# Patient Record
Sex: MALE | Race: WHITE | NOT HISPANIC OR LATINO | Employment: FULL TIME
[De-identification: names, ages, dates, MRNs, and addresses within clinical notes are randomized per-mention and may not be internally consistent; named-entity substitution may affect disease eponyms.]

---

## 2023-04-15 ENCOUNTER — HEALTH MAINTENANCE LETTER (OUTPATIENT)
Age: 28
End: 2023-04-15

## 2023-04-20 ENCOUNTER — OFFICE VISIT (OUTPATIENT)
Dept: FAMILY MEDICINE | Facility: CLINIC | Age: 28
End: 2023-04-20
Payer: COMMERCIAL

## 2023-04-20 VITALS
BODY MASS INDEX: 30.52 KG/M2 | RESPIRATION RATE: 14 BRPM | TEMPERATURE: 98.4 F | WEIGHT: 201.4 LBS | SYSTOLIC BLOOD PRESSURE: 124 MMHG | HEIGHT: 68 IN | HEART RATE: 99 BPM | OXYGEN SATURATION: 97 % | DIASTOLIC BLOOD PRESSURE: 74 MMHG

## 2023-04-20 DIAGNOSIS — Z13.220 SCREENING FOR LIPOID DISORDERS: ICD-10-CM

## 2023-04-20 DIAGNOSIS — F90.9 ATTENTION DEFICIT HYPERACTIVITY DISORDER (ADHD), UNSPECIFIED ADHD TYPE: Primary | ICD-10-CM

## 2023-04-20 DIAGNOSIS — Z23 NEED FOR VACCINATION: ICD-10-CM

## 2023-04-20 LAB
CHOLEST SERPL-MCNC: 155 MG/DL
HDLC SERPL-MCNC: 29 MG/DL
LDLC SERPL CALC-MCNC: 70 MG/DL
NONHDLC SERPL-MCNC: 126 MG/DL
TRIGL SERPL-MCNC: 281 MG/DL

## 2023-04-20 PROCEDURE — 90715 TDAP VACCINE 7 YRS/> IM: CPT | Performed by: FAMILY MEDICINE

## 2023-04-20 PROCEDURE — 99203 OFFICE O/P NEW LOW 30 MIN: CPT | Mod: 25 | Performed by: FAMILY MEDICINE

## 2023-04-20 PROCEDURE — 90471 IMMUNIZATION ADMIN: CPT | Performed by: FAMILY MEDICINE

## 2023-04-20 PROCEDURE — 80061 LIPID PANEL: CPT | Performed by: FAMILY MEDICINE

## 2023-04-20 PROCEDURE — 36415 COLL VENOUS BLD VENIPUNCTURE: CPT | Performed by: FAMILY MEDICINE

## 2023-04-20 ASSESSMENT — PAIN SCALES - GENERAL: PAINLEVEL: NO PAIN (0)

## 2023-04-20 NOTE — PROGRESS NOTES
"  Assessment & Plan   Problem List Items Addressed This Visit    None  Visit Diagnoses     Attention deficit hyperactivity disorder (ADHD), unspecified ADHD type    -  Primary    Relevant Orders    Adult Mental Health  Referral    Screening for lipoid disorders        Relevant Orders    Lipid panel reflex to direct LDL Non-fasting (Completed)    Need for vaccination        Relevant Orders    TDAP VACCINE (Adacel, Boostrix) (Completed)                RIANA RINALDI MD  [facility] CECY Vela is a 28 year old, presenting for the following health issues:  Establish Care (Has not been to a doctor for a long time. Was at [facility] and then [facility]. ), Mass (Back of left ankle. Pt states it is itchy and hard. ), and A.D.H.D (Pt has been previously diagnosed with ADHD and was on concerta at that time. Pt remembers not liking concerta because it took away his personality. )        4/20/2023     2:17 PM   Additional Questions   Roomed by Tessie Del Cid    A.D.H.D    History of Present Illness       Reason for visit:  Need to establish new primary care, i want to get a concerning skin issue looked at, and i want to seek treatment for adhd. I know that probably cant be all done in one appointment but i havent been to a doctor in years    He eats 0-1 servings of fruits and vegetables daily.He consumes 2 sweetened beverage(s) daily.He exercises with enough effort to increase his heart rate 30 to 60 minutes per day.  He exercises with enough effort to increase his heart rate 3 or less days per week.   He is taking medications regularly.              Objective    /74 (BP Location: Right arm, Patient Position: Sitting, Cuff Size: Adult Regular)   Pulse 99   Temp 98.4  F (36.9  C) (Oral)   Resp 14   Ht 1.727 m (5' 8\")   Wt 91.4 kg (201 lb 6.4 oz)   SpO2 97%   BMI 30.62 kg/m    Body mass index is 30.62 kg/m .  Physical Exam   GENERAL: healthy, " alert and no distress  NECK: no adenopathy, no asymmetry, masses, or scars and thyroid normal to palpation  RESP: lungs clear to auscultation - no rales, rhonchi or wheezes  CV: regular rate and rhythm, normal S1 S2, no S3 or S4, no murmur, click or rub, no peripheral edema and peripheral pulses strong     MS: no gross musculoskeletal defects noted, no edema

## 2023-04-28 ENCOUNTER — VIRTUAL VISIT (OUTPATIENT)
Dept: BEHAVIORAL HEALTH | Facility: CLINIC | Age: 28
End: 2023-04-28
Payer: COMMERCIAL

## 2023-04-28 ENCOUNTER — VIRTUAL VISIT (OUTPATIENT)
Dept: PSYCHIATRY | Facility: CLINIC | Age: 28
End: 2023-04-28
Attending: FAMILY MEDICINE
Payer: COMMERCIAL

## 2023-04-28 DIAGNOSIS — F84.0 AUTISTIC SPECTRUM DISORDER: ICD-10-CM

## 2023-04-28 DIAGNOSIS — F84.0 AUTISM SPECTRUM DISORDER: ICD-10-CM

## 2023-04-28 DIAGNOSIS — Z86.59 HISTORY OF ANXIETY: ICD-10-CM

## 2023-04-28 DIAGNOSIS — F90.9 ATTENTION DEFICIT HYPERACTIVITY DISORDER (ADHD), UNSPECIFIED ADHD TYPE: Primary | ICD-10-CM

## 2023-04-28 PROCEDURE — 99204 OFFICE O/P NEW MOD 45 MIN: CPT | Mod: VID | Performed by: PSYCHIATRY & NEUROLOGY

## 2023-04-28 PROCEDURE — 90791 PSYCH DIAGNOSTIC EVALUATION: CPT | Mod: VID | Performed by: PSYCHOLOGIST

## 2023-04-28 RX ORDER — DEXTROAMPHETAMINE SACCHARATE, AMPHETAMINE ASPARTATE MONOHYDRATE, DEXTROAMPHETAMINE SULFATE AND AMPHETAMINE SULFATE 3.75; 3.75; 3.75; 3.75 MG/1; MG/1; MG/1; MG/1
15 CAPSULE, EXTENDED RELEASE ORAL DAILY
Qty: 15 CAPSULE | Refills: 0 | Status: SHIPPED | OUTPATIENT
Start: 2023-04-28 | End: 2023-05-10 | Stop reason: DRUGHIGH

## 2023-04-28 ASSESSMENT — COLUMBIA-SUICIDE SEVERITY RATING SCALE - C-SSRS
TOTAL  NUMBER OF ABORTED OR SELF INTERRUPTED ATTEMPTS LIFETIME: NO
1. HAVE YOU WISHED YOU WERE DEAD OR WISHED YOU COULD GO TO SLEEP AND NOT WAKE UP?: NO
ATTEMPT LIFETIME: NO
2. HAVE YOU ACTUALLY HAD ANY THOUGHTS OF KILLING YOURSELF?: NO
6. HAVE YOU EVER DONE ANYTHING, STARTED TO DO ANYTHING, OR PREPARED TO DO ANYTHING TO END YOUR LIFE?: NO
TOTAL  NUMBER OF INTERRUPTED ATTEMPTS LIFETIME: NO

## 2023-04-28 ASSESSMENT — PATIENT HEALTH QUESTIONNAIRE - PHQ9
10. IF YOU CHECKED OFF ANY PROBLEMS, HOW DIFFICULT HAVE THESE PROBLEMS MADE IT FOR YOU TO DO YOUR WORK, TAKE CARE OF THINGS AT HOME, OR GET ALONG WITH OTHER PEOPLE: SOMEWHAT DIFFICULT
SUM OF ALL RESPONSES TO PHQ QUESTIONS 1-9: 9
SUM OF ALL RESPONSES TO PHQ QUESTIONS 1-9: 9
10. IF YOU CHECKED OFF ANY PROBLEMS, HOW DIFFICULT HAVE THESE PROBLEMS MADE IT FOR YOU TO DO YOUR WORK, TAKE CARE OF THINGS AT HOME, OR GET ALONG WITH OTHER PEOPLE: SOMEWHAT DIFFICULT
SUM OF ALL RESPONSES TO PHQ QUESTIONS 1-9: 9
SUM OF ALL RESPONSES TO PHQ QUESTIONS 1-9: 9
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
## 2023-04-28 NOTE — PATIENT INSTRUCTIONS
Treatment Plan:  Start Adderall XR 15 mg daily for ADHD.   Continue all other cares per primary care provider.   Continue all other medications as reviewed per electronic medical record today.   Safety plan reviewed. To the Emergency Department as needed or call after hours crisis line at [phone] or [phone]. [place] Crisis Text Line: Text [place] to 389375  or  Suicide LifeLine Chat: suicidepreventionForward Health Groupline.org/chat  Schedule an appointment with me in 2 weeks or sooner as needed.  Call [facility] at [phone] to schedule.  Follow up with primary care provider as planned or sooner if needed for acute medical concerns.  Call the psychiatric nurse line with medication questions or concerns at [phone].  [employer] may be used to communicate with your provider, but this is not intended to be used for emergencies.    Patient Education:  Discussed risks of stimulant medication including, but not limited to, decreased appetite, risk of tics (and that they may be lasting), trouble sleeping, cardiac risks such as increased heart rate and blood pressure, and rare risk of sudden cardiac death.  Also risk of addiction/tolerance/dependence.      Good ADHD resources:  Books-Mastering Adult ADHD, Driven to Distraction, Take Charge of Adult ADHD  Website-www.iSTAR    ADHD medication expectations:   https://www.Dojo/slideshows/zro-xozu-jnxc-medication-work/    What to Expect and What NOT to Expect from Stimulant Medication  by Anastasiya Travis, PhD    Many clients taking stimulant medication aren t sure what to expect from it. They may have unrealistic expectations of their medication and decide it s not working. Or they may have become used to the benefits of stimulant medication and think it s no longer working.    Here s a list of things to expect when taking stimulant medications. Of course, everyone has a different reaction and a different level of sensitivity to medication, so  some seem to benefit much more clearly than others.    A good response to stimulant medication typically results in:    - Improved attention span - being able to read longer while staying focused; being able to listen longer while staying focused.    - Reduced distractability - being able to remain focused when some distractions occur around you.    - Greater ease in getting back on task after an interruption.    - Better working memory - i.e., being able to remember the three things you went downstairs to get.    - Easier to start tasks and complete them.    - Reduced feeling of stress and overwhelm.    - Decreased irritability and over-reactivity    - Reduced feelings of restlessness or hyperactivity    - Reduced impulsiveness - less likely to interrupt, to make decisions with little consideration for costs or consequences    However, as Oliver Demarco MD, said so memorably,  Pills don t build skills.  You shouldn t expect stimulant medication to help you organize your files, improve your study skills, write your paper, prioritize your tasks, reduce your clutter, or problem-solve better. But it will put your brain in a state where these skills can be more easily learned.    Often, the best pairing is stimulant medication in combination with a therapist, organizer or  that is helping you build the skills you need to succeed now that you re able to focus and concentrate.      Community Resources:    National Suicide Prevention Lifeline: 209.332.3433 (TTY: 426.121.7651). Call anytime for help.  (www.suicidepreventionlifeline.org)  National [place] [employer] Mental Illness (www.yoan.org): 786.166.2668 or 503-006-9319.   Mental Health Association (www.mentalhealth.org): 350.909.2100 or 886-092-1004.  [place] Crisis Text Line: Text [place] to 053325  Suicide LifeLine Chat: suicidepreventionlifeline.org/chat    Patient Education   Collaborative Care Psychiatry Service  What to Expect  Here's what to expect from your  "Collaborative Care Psychiatry Service (CCPS).   About CCPS  CCPS means 2 people work together to help you get better. You'll meet with a behavioral health clinician and a psychiatric doctor. A behavioral health clinician helps people with mental health problems by talking with them. A psychiatric doctor helps people by giving them medicine.  How it works  At every visit, you'll see the behavioral health clinician (BHC) first. They'll talk with you about how you're doing and teach you how to feel better.   Then you'll see the psychiatric doctor. This doctor can help you deal with troubling thoughts and feelings by giving you medicine. They'll make sure you know the plan for your care.   CCPS usually takes 3 to 6 visits. If you need more visits, we may have you start seeing a different psychiatric doctor for ongoing care.  If you have any questions or concerns, we'll be glad to talk with you.  About visits  Be open  At your visits, please talk openly about your problems. It may feel hard, but it's the best way for us to help you.  Cancelling visits  If you can't come to your visit, please call us right away at [phone]. If you don't cancel at least 24 hours (1 full day) before your visit, that's \"late cancellation.\"  Being late to visits  Being very late is the same as not showing up. You will be a \"no show\" if:  Your appointment starts with a BHC, and you're more than 15 minutes late for a 30-minute (half hour) visit. This will also cancel your appointment with the psychiatric doctor.  Your appointment is with a psychiatric doctor only, and you're more than 15 minutes late for a 30-minute (half hour) visit.  Your appointment is with a psychiatric doctor only, and you're more than 30 minutes late for a 60-minute (full hour) visit.  If you cancel late or don't show up 2 times within 6 months, we may end your care.   Getting help between visits  If you need help between visits, you can call us Monday to Friday " from 8 a.m. to 4:30 p.m. at [phone].  Emergency care  Call 911 or go to the nearest emergency department if your life or someone else's life is in danger.  Call 988 anytime to reach the national Suicide and Crisis hotline.  Medicine refills  To refill your medicine, call your pharmacy. You can also call [facility]'s Behavioral Access at [phone], Monday to Friday, 8 a.m. to 4:30 p.m. It can take 1 to 3 business days to get a refill.   Forms, letters, and tests  You may have papers to fill out, like FMLA, short-term disability, and workability. We can help you with these forms at your visits, but you must have an appointment. You may need more than 1 visit for this, to be in an intensive therapy program, or both.  Before we can give you medicine for ADHD, we may refer you to get tested for it or confirm it another way.  We may not be able to give you an emotional support animal letter.  We don't do mental health checks ordered by the court.   We don't do mental health testing, but we can refer you to get tested.   Thank you for choosing us for your care.  For informational purposes only. Not to replace the advice of your health care provider. Copyright   2022 [facility]. All rights reserved. [employer] 366153 - 12/22.
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
## 2023-05-05 ENCOUNTER — MYC MEDICAL ADVICE (OUTPATIENT)
Dept: PSYCHIATRY | Facility: CLINIC | Age: 28
End: 2023-05-05
Payer: COMMERCIAL

## 2023-05-10 ENCOUNTER — VIRTUAL VISIT (OUTPATIENT)
Dept: PSYCHIATRY | Facility: CLINIC | Age: 28
End: 2023-05-10
Payer: COMMERCIAL

## 2023-05-10 DIAGNOSIS — Z86.59 HISTORY OF ANXIETY: ICD-10-CM

## 2023-05-10 DIAGNOSIS — F84.0 AUTISM SPECTRUM DISORDER: ICD-10-CM

## 2023-05-10 DIAGNOSIS — F90.9 ATTENTION DEFICIT HYPERACTIVITY DISORDER (ADHD), UNSPECIFIED ADHD TYPE: Primary | ICD-10-CM

## 2023-05-10 PROCEDURE — 99214 OFFICE O/P EST MOD 30 MIN: CPT | Mod: VID | Performed by: PSYCHIATRY & NEUROLOGY

## 2023-05-10 RX ORDER — DEXTROAMPHETAMINE SACCHARATE, AMPHETAMINE ASPARTATE, DEXTROAMPHETAMINE SULFATE AND AMPHETAMINE SULFATE 2.5; 2.5; 2.5; 2.5 MG/1; MG/1; MG/1; MG/1
5-10 TABLET ORAL DAILY PRN
Qty: 30 TABLET | Refills: 0 | Status: SHIPPED | OUTPATIENT
Start: 2023-05-10 | End: 2023-06-08

## 2023-05-10 RX ORDER — DEXTROAMPHETAMINE SACCHARATE, AMPHETAMINE ASPARTATE MONOHYDRATE, DEXTROAMPHETAMINE SULFATE AND AMPHETAMINE SULFATE 5; 5; 5; 5 MG/1; MG/1; MG/1; MG/1
20 CAPSULE, EXTENDED RELEASE ORAL DAILY
Qty: 30 CAPSULE | Refills: 0 | Status: SHIPPED | OUTPATIENT
Start: 2023-05-10 | End: 2023-06-08

## 2023-05-10 NOTE — PATIENT INSTRUCTIONS
Treatment Plan:  Increase Adderall XR to 20 mg daily for ADHD.  Start Adderall immediate release 5-10 mg daily as needed for ADHD.  Continue all other cares per primary care provider.   Continue all other medications as reviewed per electronic medical record today.   Safety plan reviewed. To the Emergency Department as needed or call after hours crisis line at [phone] or [phone]. [place] Crisis Text Line. Text [place] to 513105 or Suicide LifeLine Chat: suicidepreventionlifeline.org/chat  Continue therapy as needed.  Schedule an appointment with me in 4 weeks or sooner as needed. Call [facility] at [phone] to schedule.  Follow up with primary care provider as planned or for acute medical concerns.  Call the psychiatric nurse line with medication questions or concerns at [phone].  Read[employer] may be used to communicate with your provider, but this is not intended to be used for emergencies.    Have previously discussed risks of stimulant medication including, but not limited to, decreased appetite, risk of tics (and that they may be lasting), trouble sleeping, cardiac risks such as increased heart rate and blood pressure, and rare risk of sudden cardiac death.  Also risk of addiction/tolerance/dependence.    Patient Education   Collaborative Care Psychiatry Service  What to Expect  Here's what to expect from your Collaborative Care Psychiatry Service (CCPS).   About CCPS  CCPS means 2 people work together to help you get better. You'll meet with a behavioral health clinician and a psychiatric doctor. A behavioral health clinician helps people with mental health problems by talking with them. A psychiatric doctor helps people by giving them medicine.  How it works  At every visit, you'll see the behavioral health clinician (BHC) first. They'll talk with you about how you're doing and teach you how to feel better.   Then you'll see the psychiatric doctor. This doctor can help you deal with  "troubling thoughts and feelings by giving you medicine. They'll make sure you know the plan for your care.   CCPS usually takes 3 to 6 visits. If you need more visits, we may have you start seeing a different psychiatric doctor for ongoing care.  If you have any questions or concerns, we'll be glad to talk with you.  About visits  Be open  At your visits, please talk openly about your problems. It may feel hard, but it's the best way for us to help you.  Cancelling visits  If you can't come to your visit, please call us right away at [phone]. If you don't cancel at least 24 hours (1 full day) before your visit, that's \"late cancellation.\"  Being late to visits  Being very late is the same as not showing up. You will be a \"no show\" if:  Your appointment starts with a [facility], and you're more than 15 minutes late for a 30-minute (half hour) visit. This will also cancel your appointment with the psychiatric doctor.  Your appointment is with a psychiatric doctor only, and you're more than 15 minutes late for a 30-minute (half hour) visit.  Your appointment is with a psychiatric doctor only, and you're more than 30 minutes late for a 60-minute (full hour) visit.  If you cancel late or don't show up 2 times within 6 months, we may end your care.   Getting help between visits  If you need help between visits, you can call us Monday to Friday from 8 a.m. to 4:30 p.m. at [phone].  Emergency care  Call 911 or go to the nearest emergency department if your life or someone else's life is in danger.  Call 988 anytime to reach the national Suicide and Crisis hotline.  Medicine refills  To refill your medicine, call your pharmacy. You can also call [facility]'s Behavioral Access at [phone], Monday to Friday, 8 a.m. to 4:30 p.m. It can take 1 to 3 business days to get a refill.   Forms, letters, and tests  You may have papers to fill out, like FMLA, short-term disability, and workability. We can help you " with these forms at your visits, but you must have an appointment. You may need more than 1 visit for this, to be in an intensive therapy program, or both.  Before we can give you medicine for ADHD, we may refer you to get tested for it or confirm it another way.  We may not be able to give you an emotional support animal letter.  We don't do mental health checks ordered by the court.   We don't do mental health testing, but we can refer you to get tested.   Thank you for choosing us for your care.  For informational purposes only. Not to replace the advice of your health care provider. Copyright   2022 [facility]. All rights reserved. [employer] [id number] - 12/22.

## 2023-05-10 NOTE — PROGRESS NOTES
"Telemedicine Visit: The patient's condition can be safely assessed and treated via synchronous audio and visual telemedicine encounter.      Reason for Telemedicine Visit: Patient has requested telehealth visit    Originating Site (Patient Location): Patient's home    Distant Location (provider location):  On-site    Consent:  The patient/guardian has verbally consented to: the potential risks and benefits of telemedicine (video visit) versus in person care; bill my insurance or make self-payment for services provided; and responsibility for payment of non-covered services.     Mode of Communication:  Video Conference via [employer]    As the provider I attest to compliance with applicable laws and regulations related to telemedicine.         Outpatient Psychiatric Progress Note    Name: Dane Catsillo   : 1995                    Primary Care Provider: RIANA RINALDI MD   Therapist: None    PHQ-9 scores:      2023    10:35 AM   PHQ-9 SCORE   PHQ-9 Total Score MyChart 9 (Mild depression)   PHQ-9 Total Score 9    9       PATRICIA-7 scores:       View : No data to display.                Patient Identification:  Patient is a 28 year old, partnered / significant other  White Not  or  male  who presents for return visit with me.  Patient is currently employed full time. Patient attended the phone/video session alone. Patient prefers to be called: \"Dane\".    Interim History:  I last saw Dane Castlilo for outpatient psychiatry Consultation on 2023. During that appointment, we:      Start Adderall XR 15 mg daily for ADHD.     Continue all other cares per primary care provider.     5/10: Patient reports overall things going okay on Adderall.  Patient is finding it helpful.  Patient works overnights.  Takes medication around 5-6 PM and feels like it works quite well but does experience a decent dip around 6 hours into the medication.  Seems to  a little bit after the DIP but then wears off " "prior to the end of the patient's long shifts.  No acute safety concerns.  No SI.  No changes in drug or alcohol use-no problematic drug or alcohol use.  Patient denies any negative side effects from the medication.  No chest pain, no racing heart.  Patient is sleeping better.  Decreased appetite which patient finds beneficial because still eating sufficiently and getting hungry.    Per [facility], Dr. Sumit Castillo, during today's team-based visit:  No [facility] appt today.     Past Psychiatric Med Trials:  Psych Meds at Intake:  None     Past Psych Meds:  Citalopram - \"worked great for me\" felt like it had a lasting impact since still feels better off the medication, off several years  Concerta - father noted it may have changed pt's personality, pt did not like it either  Strattera - doesn't remember    Psychiatric ROS:  Dane Castillo reports mood has been: Stable  Anxiety has been: Stable  Sleep has been: Improved  Elizabeth sxs: None  Psychosis sxs: None  ADHD/ADD sxs: Improving  PTSD sxs: N/A  PHQ9 and GAD7 scores were reviewed today if completed.   Medication side effects: See HPI above  Current stressors include: And see HPI above  Coping mechanisms and supports include: Family, Hobbies and Friends    Current medications include:   Current Outpatient Medications   Medication Sig     amphetamine-dextroamphetamine (ADDERALL XR) 15 MG 24 hr capsule Take 1 capsule (15 mg) by mouth daily     No current facility-administered medications for this visit.       The [place] Prescription Monitoring Program has been reviewed and there are no concerns about diversionary activity for controlled substances at this time.   04/28/2023  1   04/28/2023  Dextroamp-Amphet Er 15 MG Cap  15.00  [facility]   [id number]   [facility] ([id number])   0/0   Comm Ins   [place]     Past Medical/Surgical History:  No past medical history on file.   has no past medical history of Arthritis, Cancer (H), Cerebral infarction (H), Congestive heart failure (H), COPD (chronic obstructive " pulmonary disease) (H), Depressive disorder, Diabetes (H), Heart disease, History of blood transfusion, Hypertension, Thyroid disease, or Uncomplicated asthma.    Social History:  Reviewed. No changes to social history except as noted above in HPI.    Vital Signs:   None. This is phone/video visit.     Labs:  Most recent laboratory results reviewed and no new labs.     Review of Systems:  10 systems (general, cardiovascular, respiratory, eyes, ENT, endocrine, GI, , M/S, neurological) were reviewed. Most pertinent finding(s) is/are:  denies fever, cough, persistent headaches, shortness of breath, chest pain, severe GI symptoms, trouble urinating, severe pain. The remaining systems are all unremarkable.    Mental Status Examination (limited as this is by phone/video):  Appearance: Awake, alert, appears stated age, no acute distress, well-groomed   Attitude:  cooperative, pleasant   Motor: No gross abnormalities observed via video, not formally tested   Oriented to:  person, place, time, and situation  Attention Span and Concentration:  normal  Speech:  clear, coherent, regular rate, rhythm, and volume  Language: intact  Mood: Good  Affect:  appropriate and in normal range and mood congruent  Associations:  no loose associations  Thought Process:  logical, linear and goal oriented  Thought Content:  no evidence of suicidal ideation or homicidal ideation, no evidence of psychotic thought, no auditory hallucinations present and no visual hallucinations present  Recent and Remote Memory:  Intact to interview. Not formally assessed. No amnesia.  Fund of Knowledge: appropriate  Insight:  good  Judgment:  intact, adequate for safety  Impulse Control:  intact    Suicide Risk Assessment:  Today Dane Castillo reports no suicidal ideation. Based on all available evidence including the factors cited above, Dane Castillo does not appear to be at imminent risk for self-harm, does not meet criteria for a 72-hr hold, and therefore  remains appropriate for ongoing outpatient level of care.  A thorough assessment of risk factors related to suicide and self-harm have been reviewed and are noted above. The patient convincingly denies suicidality on several occasions. Local community safety resources reviewed for patient to use if needed. There was no deceit detected, and the patient presented in a manner that was believable.     DSM5 Diagnosis:  Attention-Deficit/Hyperactivity Disorder  314.01 (F90.9) Unspecified Attention -Deficit / Hyperactivity Disorder   Autism Spectrum Disorder  Hx of Anxiety    Medical comorbidities include: There are no problems to display for this patient.      Psychosocial & Contextual Factors: see HPI above    Assessment:  From Intake, 4/28/2023:  Dane Castillo is a 28-year-old male with past psychiatric history including anxiety, autism spectrum disorder, ADHD who presents today for psychiatric evaluation.  Patient recently established care with primary care provider and expressed desire to restart ADHD medications.  Primary care provider referred to psychiatry.  Patient with history of anxiety and did well on citalopram but has been off the medication for several years.  Does not feel the need to be back on anxiolytic medication at this time.  Patient struggling with ADHD symptoms since work schedule is now 12 hours/day for 3 days a week.  History of Concerta but affected personality too much as a child and so would like to consider a trial with amphetamine-based stimulant.  Patient's insurance requires prior authorization for Vyvanse so we will start a trial with Adderall XR.  No personal cardiac history and no known major familial cardiac issues/congenital heart issues.  Patient denies any tics.  Discussed risks and benefits of therapy.  Patient would like to move forward with Adderall XR trial.  No problematic drug or alcohol use.     5/10/2023:  Patient overall with some improvement on Adderall XR.  We will continue  to optimize dose since patient is experiencing a decent mid day dip.  Does  some and then wears off again before shift is complete.  Patient does work long shifts.  Because of this we will add a booster dose patient can take towards the end of his shift if he prefers.  No acute safety concerns.  No SI.  No problematic drug or alcohol use.    Medication side effects and alternatives were reviewed. Health promotion activities recommended and reviewed today. All questions addressed. Education and counseling completed regarding risks and benefits of medications and psychotherapy options. Recommend therapy for additional support.     Treatment Plan:    Increase Adderall XR to 20 mg daily for ADHD.    Start Adderall immediate release 5-10 mg daily as needed for ADHD.    Continue all other cares per primary care provider.     Continue all other medications as reviewed per electronic medical record today.     Safety plan reviewed. To the Emergency Department as needed or call after hours crisis line at [phone] or [phone]. [place] Crisis Text Line. Text [place] to 809960 or Suicide LifeLine Chat: suicidepreventionlifeline.org/chat    Continue therapy as needed.    Schedule an appointment with me in 4 weeks or sooner as needed. Call [facility] at [phone] to schedule.    Follow up with primary care provider as planned or for acute medical concerns.    Call the psychiatric nurse line with medication questions or concerns at [phone].    MyChart may be used to communicate with your provider, but this is not intended to be used for emergencies.    Have previously discussed risks of stimulant medication including, but not limited to, decreased appetite, risk of tics (and that they may be lasting), trouble sleeping, cardiac risks such as increased heart rate and blood pressure, and rare risk of sudden cardiac death.  Also risk of addiction/tolerance/dependence.    Care team has reviewed  attendance agreement with patient. Patient advised that two failed appointments within 6 months may lead to termination of current episode of care.      Administrative Billing:   Phone Call/Video Duration: 10 Minutes  Start: 2:55p  Stop: 3:05p    Patient Status:  Patient will continue to be seen for ongoing consultation and stabilization.    Signed:   Cheyenne Jones DO  [facility]    Disclaimer: This note consists of symbols derived from keyboarding, dictation and/or voice recognition software. As a result, there may be errors in the script that have gone undetected. Please consider this when interpreting information found in this chart.

## 2023-06-08 ENCOUNTER — VIRTUAL VISIT (OUTPATIENT)
Dept: PSYCHIATRY | Facility: CLINIC | Age: 28
End: 2023-06-08
Payer: COMMERCIAL

## 2023-06-08 DIAGNOSIS — F84.0 AUTISM SPECTRUM DISORDER: ICD-10-CM

## 2023-06-08 DIAGNOSIS — F90.9 ATTENTION DEFICIT HYPERACTIVITY DISORDER (ADHD), UNSPECIFIED ADHD TYPE: Primary | ICD-10-CM

## 2023-06-08 DIAGNOSIS — Z86.59 HISTORY OF ANXIETY: ICD-10-CM

## 2023-06-08 PROCEDURE — 99214 OFFICE O/P EST MOD 30 MIN: CPT | Mod: VID | Performed by: PSYCHIATRY & NEUROLOGY

## 2023-06-08 RX ORDER — DEXTROAMPHETAMINE SACCHARATE, AMPHETAMINE ASPARTATE MONOHYDRATE, DEXTROAMPHETAMINE SULFATE AND AMPHETAMINE SULFATE 3.75; 3.75; 3.75; 3.75 MG/1; MG/1; MG/1; MG/1
15 CAPSULE, EXTENDED RELEASE ORAL DAILY
Qty: 30 CAPSULE | Refills: 0 | Status: SHIPPED | OUTPATIENT
Start: 2023-08-09 | End: 2023-09-08

## 2023-06-08 RX ORDER — DEXTROAMPHETAMINE SACCHARATE, AMPHETAMINE ASPARTATE, DEXTROAMPHETAMINE SULFATE AND AMPHETAMINE SULFATE 2.5; 2.5; 2.5; 2.5 MG/1; MG/1; MG/1; MG/1
5-10 TABLET ORAL DAILY PRN
Qty: 30 TABLET | Refills: 0 | Status: SHIPPED | OUTPATIENT
Start: 2023-08-09 | End: 2023-09-08

## 2023-06-08 RX ORDER — DEXTROAMPHETAMINE SACCHARATE, AMPHETAMINE ASPARTATE, DEXTROAMPHETAMINE SULFATE AND AMPHETAMINE SULFATE 2.5; 2.5; 2.5; 2.5 MG/1; MG/1; MG/1; MG/1
5-10 TABLET ORAL DAILY PRN
Qty: 30 TABLET | Refills: 0 | Status: SHIPPED | OUTPATIENT
Start: 2023-07-09 | End: 2023-08-08

## 2023-06-08 RX ORDER — DEXTROAMPHETAMINE SACCHARATE, AMPHETAMINE ASPARTATE MONOHYDRATE, DEXTROAMPHETAMINE SULFATE AND AMPHETAMINE SULFATE 3.75; 3.75; 3.75; 3.75 MG/1; MG/1; MG/1; MG/1
15 CAPSULE, EXTENDED RELEASE ORAL DAILY
Qty: 30 CAPSULE | Refills: 0 | Status: SHIPPED | OUTPATIENT
Start: 2023-07-09 | End: 2023-08-08

## 2023-06-08 RX ORDER — DEXTROAMPHETAMINE SACCHARATE, AMPHETAMINE ASPARTATE, DEXTROAMPHETAMINE SULFATE AND AMPHETAMINE SULFATE 2.5; 2.5; 2.5; 2.5 MG/1; MG/1; MG/1; MG/1
5-10 TABLET ORAL DAILY PRN
Qty: 30 TABLET | Refills: 0 | Status: SHIPPED | OUTPATIENT
Start: 2023-06-08 | End: 2023-07-08

## 2023-06-08 RX ORDER — DEXTROAMPHETAMINE SACCHARATE, AMPHETAMINE ASPARTATE MONOHYDRATE, DEXTROAMPHETAMINE SULFATE AND AMPHETAMINE SULFATE 3.75; 3.75; 3.75; 3.75 MG/1; MG/1; MG/1; MG/1
15 CAPSULE, EXTENDED RELEASE ORAL DAILY
Qty: 30 CAPSULE | Refills: 0 | Status: SHIPPED | OUTPATIENT
Start: 2023-06-08 | End: 2023-07-08

## 2023-06-08 NOTE — PROGRESS NOTES
"Telemedicine Visit: The patient's condition can be safely assessed and treated via synchronous audio and visual telemedicine encounter.      Reason for Telemedicine Visit: Patient has requested telehealth visit    Originating Site (Patient Location): Patient's home    Distant Location (provider location):  On-site    Consent:  The patient/guardian has verbally consented to: the potential risks and benefits of telemedicine (video visit) versus in person care; bill my insurance or make self-payment for services provided; and responsibility for payment of non-covered services.     Mode of Communication:  Video Conference via [employer]    As the provider I attest to compliance with applicable laws and regulations related to telemedicine.         Outpatient Psychiatric Progress Note    Name: Dane Castillo   : 1995                    Primary Care Provider: RIANA RINALDI MD   Therapist: None    PHQ-9 scores:      2023    10:35 AM   PHQ-9 SCORE   PHQ-9 Total Score MyChart 9 (Mild depression)   PHQ-9 Total Score 9    9       PATRICIA-7 scores:       View : No data to display.                Patient Identification:  Patient is a 28 year old, partnered / significant other  White Not  or  male  who presents for return visit with me.  Patient is currently employed full time. Patient attended the phone/video session alone. Patient prefers to be called: \"Dane\".    Interim History:  I last saw Dane Castillo for outpatient psychiatry return visit on 5/10/2023. During that appointment, we:      Increase Adderall XR to 20 mg daily for ADHD.    Start Adderall immediate release 5-10 mg daily as needed for ADHD.    Continue all other cares per primary care provider.     : Patient overall doing well on current medication regimen.  Patient reports gets a pit in his stomach if he does not take it with food and feels like the 20 mg XR dose might be a little too much.  Feels like the booster dose of Adderall " "immediate release has been very helpful in the evenings.  Does not take it any later than 6 hours before bedtime to ensure he can sleep well at night.  Tolerating medications well with no chest pains, racing heart, palpitations.  Adequate caloric intake on the medication.  Using as prescribed.  No acute safety concerns.  No SI.  No problematic drug or alcohol use.    Per [facility], ROSIE Peña, during today's team-based visit:  No [facility] appointment      Past Psychiatric Med Trials:  Psych Meds at Intake:  None     Past Psych Meds:  Citalopram - \"worked great for me\" felt like it had a lasting impact since still feels better off the medication, off several years  Concerta - father noted it may have changed pt's personality, pt did not like it either  Strattera - doesn't remember    Psychiatric ROS:  Dane Castillo reports mood has been: Stable  Anxiety has been: Stable  Sleep has been: Sleeping okay  Elizabeth sxs: None  Psychosis sxs: None  ADHD/ADD sxs: Stable  PTSD sxs: N/A  PHQ9 and GAD7 scores were reviewed today if completed.   Medication side effects: See HPI above  Current stressors include: And see HPI above  Coping mechanisms and supports include: Family, Hobbies and Friends    Current medications include:   Current Outpatient Medications   Medication Sig     amphetamine-dextroamphetamine (ADDERALL XR) 20 MG 24 hr capsule Take 1 capsule (20 mg) by mouth daily     amphetamine-dextroamphetamine (ADDERALL) 10 MG tablet Take 0.5-1 tablets (5-10 mg) by mouth daily as needed (ADHD)     No current facility-administered medications for this visit.       The [place] Prescription Monitoring Program has been reviewed and there are no concerns about diversionary activity for controlled substances at this time.   05/10/2023  05/10/2023   1  Dextroamp-Amphetamin 10 Mg Tab 30.00  30  [facility] [place]  [id number]   [place] ([id number])  0/0   Comm [place]  [place]    05/10/2023  05/10/2023   1  Dextroamp-Amphet Er 20 Mg Cap 30.00  30  Al [facility] [place]  [id number]   [place] " ([id number])  0/0   [place]  [place]    04/28/2023 04/28/2023   1  Dextroamp-Amphet Er 15 Mg Cap 15.00  15  Al u  [id number]   [facility] ([id number])  0/0   [place]  [place]        Past Medical/Surgical History:  No past medical history on file.   has no past medical history of Arthritis, Cancer (H), Cerebral infarction (H), Congestive heart failure (H), COPD (chronic obstructive pulmonary disease) (H), Depressive disorder, Diabetes (H), Heart disease, History of blood transfusion, Hypertension, Thyroid disease, or Uncomplicated asthma.    Social History:  Reviewed. No changes to social history except as noted above in HPI.    Vital Signs:   None. This is phone/video visit.     Labs:  Most recent laboratory results reviewed and no new labs.     Review of Systems:  10 systems (general, cardiovascular, respiratory, eyes, ENT, endocrine, GI, , M/S, neurological) were reviewed. Most pertinent finding(s) is/are:  denies fever, cough, persistent headaches, shortness of breath, chest pain, severe GI symptoms, trouble urinating, severe pain. The remaining systems are all unremarkable.    Mental Status Examination (limited as this is by phone/video):  Appearance: Awake, alert, appears stated age, no acute distress, well-groomed   Attitude:  cooperative, pleasant   Motor: No gross abnormalities observed via video, not formally tested   Oriented to:  person, place, time, and situation  Attention Span and Concentration:  normal  Speech:  clear, coherent, regular rate, rhythm, and volume  Language: intact  Mood: Good  Affect:  appropriate and in normal range and mood congruent  Associations:  no loose associations  Thought Process:  logical, linear and goal oriented  Thought Content:  no evidence of suicidal ideation or homicidal ideation, no evidence of psychotic thought, no auditory hallucinations present and no visual hallucinations present  Recent and Remote Memory:  Intact to interview. Not formally assessed. No amnesia.  Fund of Knowledge:  appropriate  Insight:  good  Judgment:  intact, adequate for safety  Impulse Control:  intact    Suicide Risk Assessment:  Today Dane Castillo reports no suicidal ideation. Based on all available evidence including the factors cited above, Dane Castillo does not appear to be at imminent risk for self-harm, does not meet criteria for a 72-hr hold, and therefore remains appropriate for ongoing outpatient level of care.  A thorough assessment of risk factors related to suicide and self-harm have been reviewed and are noted above. The patient convincingly denies suicidality on several occasions. Local community safety resources reviewed for patient to use if needed. There was no deceit detected, and the patient presented in a manner that was believable.     DSM5 Diagnosis:  Attention-Deficit/Hyperactivity Disorder  314.01 (F90.9) Unspecified Attention -Deficit / Hyperactivity Disorder   Autism Spectrum Disorder  Hx of Anxiety    Medical comorbidities include: There are no problems to display for this patient.      Psychosocial & Contextual Factors: see HPI above    Assessment:  From Intake, 4/28/2023:  Dane Castillo is a 28-year-old male with past psychiatric history including anxiety, autism spectrum disorder, ADHD who presents today for psychiatric evaluation.  Patient recently established care with primary care provider and expressed desire to restart ADHD medications.  Primary care provider referred to psychiatry.  Patient with history of anxiety and did well on citalopram but has been off the medication for several years.  Does not feel the need to be back on anxiolytic medication at this time.  Patient struggling with ADHD symptoms since work schedule is now 12 hours/day for 3 days a week.  History of Concerta but affected personality too much as a child and so would like to consider a trial with amphetamine-based stimulant.  Patient's insurance requires prior authorization for Vyvanse so we will start a trial with  Adderall XR.  No personal cardiac history and no known major familial cardiac issues/congenital heart issues.  Patient denies any tics.  Discussed risks and benefits of therapy.  Patient would like to move forward with Adderall XR trial.  No problematic drug or alcohol use.     5/10/2023:  Patient overall with some improvement on Adderall XR.  We will continue to optimize dose since patient is experiencing a decent mid day dip.  Does  some and then wears off again before shift is complete.  Patient does work long shifts.  Because of this we will add a booster dose patient can take towards the end of his shift if he prefers.  No acute safety concerns.  No SI.  No problematic drug or alcohol use.    6/8/2023:  Patient overall doing well on current medication regimen except felt like Adderall XR 20 mg a little too high of a dose.  We will return back to Adderall XR 15 mg daily and continue the booster dose in the late afternoon/evening.  Patient tolerating medication well with no negative side effects.  No acute safety concerns.  No SI.  No problematic drug or alcohol use.  Patient will follow up again in 3 months and if remains stable psychiatric care will be returned back to primary care provider.    Medication side effects and alternatives were reviewed. Health promotion activities recommended and reviewed today. All questions addressed. Education and counseling completed regarding risks and benefits of medications and psychotherapy options. Recommend therapy for additional support.     Treatment Plan:    Decrease Adderall XR back to 15 mg daily for ADHD.    Continue Adderall immediate release 5-10 mg daily as needed for ADHD.    Continue all other cares per primary care provider.     Continue all other medications as reviewed per electronic medical record today.     Safety plan reviewed. To the Emergency Department as needed or call after hours crisis line at [phone] or [phone]. [facility]  Text Line. Text [place] to 860585 or Suicide LifeLine Chat: suicidepreventionlifeline.org/chat    Continue therapy as needed.    Schedule an appointment with me in 3 months or sooner as needed. Call [facility] at [phone] to schedule.    Follow up with primary care provider as planned or for acute medical concerns.    Call the psychiatric nurse line with medication questions or concerns at [phone].    MyChart may be used to communicate with your provider, but this is not intended to be used for emergencies.    Have previously discussed risks of stimulant medication including, but not limited to, decreased appetite, risk of tics (and that they may be lasting), trouble sleeping, cardiac risks such as increased heart rate and blood pressure, and rare risk of sudden cardiac death.  Also risk of addiction/tolerance/dependence.    Care team has reviewed attendance agreement with patient. Patient advised that two failed appointments within 6 months may lead to termination of current episode of care.      Administrative Billing:   Phone Call/Video Duration: 10 Minutes  Start: 2:53p  Stop: 3:03p    Patient Status:  Patient will continue to be seen for ongoing consultation and stabilization.    Signed:   Cheyenne Jones DO  [facility] Psychiatry    Disclaimer: This note consists of symbols derived from keyboarding, dictation and/or voice recognition software. As a result, there may be errors in the script that have gone undetected. Please consider this when interpreting information found in this chart.

## 2023-06-08 NOTE — PATIENT INSTRUCTIONS
Treatment Plan:  Decrease Adderall XR back to 15 mg daily for ADHD.  Continue Adderall immediate release 5-10 mg daily as needed for ADHD.  Continue all other cares per primary care provider.   Continue all other medications as reviewed per electronic medical record today.   Safety plan reviewed. To the Emergency Department as needed or call after hours crisis line at [phone] or [phone]. [place] Crisis Text Line. Text [place] to 013271 or Suicide LifeLine Chat: suicidepreventionlifeline.org/chat  Continue therapy as needed.  Schedule an appointment with me in 3 months or sooner as needed. Call [facility] at [phone] to schedule.  Follow up with primary care provider as planned or for acute medical concerns.  Call the psychiatric nurse line with medication questions or concerns at [phone].  [employer] may be used to communicate with your provider, but this is not intended to be used for emergencies.    Have previously discussed risks of stimulant medication including, but not limited to, decreased appetite, risk of tics (and that they may be lasting), trouble sleeping, cardiac risks such as increased heart rate and blood pressure, and rare risk of sudden cardiac death.  Also risk of addiction/tolerance/dependence.    Care team has reviewed attendance agreement with patient. Patient advised that two failed appointments within 6 months may lead to termination of current episode of care.

## 2023-09-08 ENCOUNTER — VIRTUAL VISIT (OUTPATIENT)
Dept: PSYCHIATRY | Facility: CLINIC | Age: 28
End: 2023-09-08
Payer: COMMERCIAL

## 2023-09-08 DIAGNOSIS — F90.9 ATTENTION DEFICIT HYPERACTIVITY DISORDER (ADHD), UNSPECIFIED ADHD TYPE: Primary | ICD-10-CM

## 2023-09-08 DIAGNOSIS — Z86.59 HISTORY OF ANXIETY: ICD-10-CM

## 2023-09-08 DIAGNOSIS — F84.0 AUTISM SPECTRUM DISORDER: ICD-10-CM

## 2023-09-08 PROCEDURE — 99213 OFFICE O/P EST LOW 20 MIN: CPT | Mod: 95 | Performed by: PSYCHIATRY & NEUROLOGY

## 2023-09-08 RX ORDER — DEXTROAMPHETAMINE SACCHARATE, AMPHETAMINE ASPARTATE, DEXTROAMPHETAMINE SULFATE AND AMPHETAMINE SULFATE 2.5; 2.5; 2.5; 2.5 MG/1; MG/1; MG/1; MG/1
5-10 TABLET ORAL DAILY PRN
Qty: 30 TABLET | Refills: 0 | Status: SHIPPED | OUTPATIENT
Start: 2023-10-09 | End: 2023-11-08

## 2023-09-08 RX ORDER — DEXTROAMPHETAMINE SACCHARATE, AMPHETAMINE ASPARTATE MONOHYDRATE, DEXTROAMPHETAMINE SULFATE AND AMPHETAMINE SULFATE 3.75; 3.75; 3.75; 3.75 MG/1; MG/1; MG/1; MG/1
15 CAPSULE, EXTENDED RELEASE ORAL DAILY
Qty: 30 CAPSULE | Refills: 0 | Status: SHIPPED | OUTPATIENT
Start: 2023-10-09 | End: 2023-11-08

## 2023-09-08 RX ORDER — DEXTROAMPHETAMINE SACCHARATE, AMPHETAMINE ASPARTATE, DEXTROAMPHETAMINE SULFATE AND AMPHETAMINE SULFATE 2.5; 2.5; 2.5; 2.5 MG/1; MG/1; MG/1; MG/1
5-10 TABLET ORAL DAILY PRN
Qty: 30 TABLET | Refills: 0 | Status: SHIPPED | OUTPATIENT
Start: 2023-11-09 | End: 2023-12-20

## 2023-09-08 RX ORDER — DEXTROAMPHETAMINE SACCHARATE, AMPHETAMINE ASPARTATE MONOHYDRATE, DEXTROAMPHETAMINE SULFATE AND AMPHETAMINE SULFATE 3.75; 3.75; 3.75; 3.75 MG/1; MG/1; MG/1; MG/1
15 CAPSULE, EXTENDED RELEASE ORAL DAILY
Qty: 30 CAPSULE | Refills: 0 | Status: SHIPPED | OUTPATIENT
Start: 2023-11-09 | End: 2023-12-20

## 2023-09-08 ASSESSMENT — PAIN SCALES - GENERAL: PAINLEVEL: NO PAIN (0)

## 2023-09-08 NOTE — PATIENT INSTRUCTIONS
Treatment Plan:  Continue Adderall XR 15 mg daily for ADHD.  Continue Adderall immediate release 5-10 mg daily as needed for ADHD.  Your psychiatric care is being returned back to your primary care provider for ongoing management.  Please reach out to your primary care provider with any questions or concerns about your mental health or mental health medications.  Continue all other cares per primary care provider.   Continue all other medications as reviewed per electronic medical record today.   Safety plan reviewed. To the Emergency Department as needed or call after hours crisis line at [phone] or [phone]. [place] Crisis Text Line. Text [place] to 416502 or Suicide LifeLine Chat: suicidepreventionElectroCoreline.org/chat  Continue therapy as needed.  Follow up with primary care provider as planned or for acute medical concerns.  [employer] may be used to communicate with your provider, but this is not intended to be used for emergencies.    Thank you for our work together in the Psychiatry Collaborative Care Model at [facility]. This is our last visit and I am returning your care back to your Primary Care Provider RIANA RINALDI MD . If you are not doing well, please contact your Primary Care Provider office.      Have previously discussed risks of stimulant medication including, but not limited to, decreased appetite, risk of tics (and that they may be lasting), trouble sleeping, cardiac risks such as increased heart rate and blood pressure, and rare risk of sudden cardiac death.  Also risk of addiction/tolerance/dependence.

## 2023-09-08 NOTE — NURSING NOTE
Is the patient currently in the state of [place]? YES    Visit mode:VIDEO    If the visit is dropped, the patient can be reconnected by: VIDEO VISIT: Text to cell phone:   Telephone Information:   Mobile [phone]       Will anyone else be joining the visit? No  (If patient encounters technical issues they should call [phone])    How would you like to obtain your AVS? MyChart    Are changes needed to the allergy or medication list? No    Rooming Documentation: Assigned questionnaire(s) completed .    Reason for visit: RECHECK     KAREN Carroll

## 2023-09-08 NOTE — PROGRESS NOTES
"Virtual Visit Details    Type of service:  Video Visit     Originating Location (pt. Location): {video visit patient location:153200::\"Home\"}  {PROVIDER LOCATION On-site should be selected for visits conducted from your clinic location or adjoining [facility] hospital, academic office, or other nearby [facility] building. Off-site should be selected for all other provider locations, including home:819193}  Distant Location (provider location):  {virtual location provider:733472}  Platform used for Video Visit: {Virtual Visit Platforms:466405::\"[employer]\"}  "

## 2023-09-08 NOTE — PROGRESS NOTES
"Telemedicine Visit: The patient's condition can be safely assessed and treated via synchronous audio and visual telemedicine encounter.      Reason for Telemedicine Visit: Patient has requested telehealth visit    Originating Site (Patient Location): Patient's home    Distant Location (provider location):  Off-Site    Consent:  The patient/guardian has verbally consented to: the potential risks and benefits of telemedicine (video visit) versus in person care; bill my insurance or make self-payment for services provided; and responsibility for payment of non-covered services.     Mode of Communication:  Video Conference via [employer]    As the provider I attest to compliance with applicable laws and regulations related to telemedicine.         Outpatient Psychiatric Progress Note    Name: Dane Castillo   : 1995                    Primary Care Provider: RIANA RINALDI MD   Therapist: None    PHQ-9 scores:      2023    10:35 AM   PHQ-9 SCORE   PHQ-9 Total Score MyChart 9 (Mild depression)   PHQ-9 Total Score 9    9       PATRICIA-7 scores:       No data to display                Patient Identification:  Patient is a 28 year old, partnered / significant other  White Not  or  male  who presents for return visit with me.  Patient is currently employed full time. Patient attended the phone/video session alone. Patient prefers to be called: \"Dane\".    Interim History:  I last saw Dane Castillo for outpatient psychiatry return visit on 2023. During that appointment, we:    Increase Adderall XR to 20 mg daily for ADHD.  Start Adderall immediate release 5-10 mg daily as needed for ADHD.  Continue all other cares per primary care provider.     : Patient is doing well.  Feels like Adderall XR 15 mg is a \"sweet spot.\"  Feels like the medication adequately controls his ADHD symptoms.  Occasionally uses the booster dose of Adderall but it can sometimes interfere with sleep if taken too late.  Mood " "stable and anxiety manageable.  No acute safety concerns.  No SI.  No problematic drug or alcohol use.  Tolerating medications well with no major negative side effects.    Past Psychiatric Med Trials:  Psych Meds at Intake:  None     Past Psych Meds:  Citalopram - \"worked great for me\" felt like it had a lasting impact since still feels better off the medication, off several years  Concerta - father noted it may have changed pt's personality, pt did not like it either  Strattera - doesn't remember    Psychiatric ROS:  Dane Castillo reports mood has been: Stable  Anxiety has been: Stable  Sleep has been: Sleeping okay  Elizabeth sxs: None  Psychosis sxs: None  ADHD/ADD sxs: Stable  PTSD sxs: N/A  PHQ9 and GAD7 scores were reviewed today if completed.   Medication side effects: Denies  Current stressors include: And see HPI above  Coping mechanisms and supports include: Family, Hobbies and Friends    Current medications include:   Current Outpatient Medications   Medication Sig    amphetamine-dextroamphetamine (ADDERALL XR) 15 MG 24 hr capsule Take 1 capsule (15 mg) by mouth daily for 30 days    amphetamine-dextroamphetamine (ADDERALL) 10 MG tablet Take 0.5-1 tablets (5-10 mg) by mouth daily as needed (ADHD)     No current facility-administered medications for this visit.       The [place] Prescription Monitoring Program has been reviewed and there are no concerns about diversionary activity for controlled substances at this time.   09/03/2023 06/08/2023 1 Dextroamp-Amphetamin 10 Mg Tab 30.00 30 Al [facility] [id number] [facility] ([id number]) 0/0  [place]   09/03/2023 06/08/2023 1 Dextroamp-Amphet Er 15 Mg Cap 30.00 30 Al u [id number] [facility] ([id number]) 0/0  [place]   07/28/2023 06/08/2023 1 Dextroamp-Amphetamin 10 Mg Tab 30.00 30 Al u [id number] [facility] ([id number]) 0/0  [place]   07/28/2023 06/08/2023 1 Dextroamp-Amphet Er 15 Mg Cap 30.00 30 Al u [id number] [facility] ([id number]) 0/0  [place]       Past Medical/Surgical History:  No past medical history on " file.   has no past medical history of Arthritis, Cancer (H), Cerebral infarction (H), Congestive heart failure (H), COPD (chronic obstructive pulmonary disease) (H), Depressive disorder, Diabetes (H), Heart disease, History of blood transfusion, Hypertension, Thyroid disease, or Uncomplicated asthma.    Social History:  Reviewed. No changes to social history except as noted above in HPI.    Vital Signs:   None. This is phone/video visit.     Labs:  Most recent laboratory results reviewed and no new labs.     Review of Systems:  10 systems (general, cardiovascular, respiratory, eyes, ENT, endocrine, GI, , M/S, neurological) were reviewed. Most pertinent finding(s) is/are:  denies fever, cough, persistent headaches, shortness of breath, chest pain, severe GI symptoms, trouble urinating, severe pain. The remaining systems are all unremarkable.    Mental Status Examination (limited as this is by phone/video):  Appearance: Awake, alert, appears stated age, no acute distress, well-groomed   Attitude:  cooperative, pleasant   Motor: No gross abnormalities observed via video, not formally tested   Oriented to:  person, place, time, and situation  Attention Span and Concentration:  normal  Speech:  clear, coherent, regular rate, rhythm, and volume  Language: intact  Mood: good  Affect:  appropriate and in normal range and mood congruent  Associations:  no loose associations  Thought Process:  logical, linear and goal oriented  Thought Content:  no evidence of suicidal ideation or homicidal ideation, no evidence of psychotic thought, no auditory hallucinations present and no visual hallucinations present  Recent and Remote Memory:  Intact to interview. Not formally assessed. No amnesia.  Fund of Knowledge: appropriate  Insight:  good  Judgment:  intact, adequate for safety  Impulse Control:  intact    Suicide Risk Assessment:  Today Dane Castillo reports no suicidal ideation. Based on all available evidence including the  factors cited above, Dane Castillo does not appear to be at imminent risk for self-harm, does not meet criteria for a 72-hr hold, and therefore remains appropriate for ongoing outpatient level of care.  A thorough assessment of risk factors related to suicide and self-harm have been reviewed and are noted above. The patient convincingly denies suicidality on several occasions. Local community safety resources reviewed for patient to use if needed. There was no deceit detected, and the patient presented in a manner that was believable.     DSM5 Diagnosis:  Attention-Deficit/Hyperactivity Disorder  314.01 (F90.9) Unspecified Attention -Deficit / Hyperactivity Disorder   Autism Spectrum Disorder  Hx of Anxiety    Medical comorbidities include: There are no problems to display for this patient.      Psychosocial & Contextual Factors: see HPI above    Assessment:  From Intake, 4/28/2023:  Dane Castillo is a 28-year-old male with past psychiatric history including anxiety, autism spectrum disorder, ADHD who presents today for psychiatric evaluation.  Patient recently established care with primary care provider and expressed desire to restart ADHD medications.  Primary care provider referred to psychiatry.  Patient with history of anxiety and did well on citalopram but has been off the medication for several years.  Does not feel the need to be back on anxiolytic medication at this time.  Patient struggling with ADHD symptoms since work schedule is now 12 hours/day for 3 days a week.  History of Concerta but affected personality too much as a child and so would like to consider a trial with amphetamine-based stimulant.  Patient's insurance requires prior authorization for Vyvanse so we will start a trial with Adderall XR.  No personal cardiac history and no known major familial cardiac issues/congenital heart issues.  Patient denies any tics.  Discussed risks and benefits of therapy.  Patient would like to move forward with  Adderall XR trial.  No problematic drug or alcohol use.     5/10/2023:  Patient overall with some improvement on Adderall XR.  We will continue to optimize dose since patient is experiencing a decent mid day dip.  Does  some and then wears off again before shift is complete.  Patient does work long shifts.  Because of this we will add a booster dose patient can take towards the end of his shift if he prefers.  No acute safety concerns.  No SI.  No problematic drug or alcohol use.    6/8/2023:  Patient overall doing well on current medication regimen except felt like Adderall XR 20 mg a little too high of a dose.  We will return back to Adderall XR 15 mg daily and continue the booster dose in the late afternoon/evening.  Patient tolerating medication well with no negative side effects.  No acute safety concerns.  No SI.  No problematic drug or alcohol use.  Patient will follow up again in 3 months and if remains stable psychiatric care will be returned back to primary care provider.    9/8/2023:  Patient stable on current medication regimen.  Tolerating well with no negative side effects.  Stimulants currently adequately control ADHD symptoms.  No acute safety concerns.  No SI.  No problematic drug or alcohol use.  Due to ongoing stability, patient's psychiatric care will be returned back to primary care provider for ongoing management.    Medication side effects and alternatives were reviewed. Health promotion activities recommended and reviewed today. All questions addressed. Education and counseling completed regarding risks and benefits of medications and psychotherapy options. Recommend therapy for additional support.     Treatment Plan:  Continue Adderall XR 15 mg daily for ADHD.  Continue Adderall immediate release 5-10 mg daily as needed for ADHD.  Your psychiatric care is being returned back to your primary care provider for ongoing management.  Please reach out to your primary care provider with any  questions or concerns about your mental health or mental health medications.  Continue all other cares per primary care provider.   Continue all other medications as reviewed per electronic medical record today.   Safety plan reviewed. To the Emergency Department as needed or call after hours crisis line at [phone] or [phone]. [place] Crisis Text Line. Text [place] to 449132 or Suicide LifeLine Chat: suicidepreventionCreativeDline.org/chat  Continue therapy as needed.  Follow up with primary care provider as planned or for acute medical concerns.  [employer] may be used to communicate with your provider, but this is not intended to be used for emergencies.    Thank you for our work together in the Psychiatry Collaborative Care Model at [facility]. This is our last visit and I am returning your care back to your Primary Care Provider RIANA RINALDI MD . If you are not doing well, please contact your Primary Care Provider office.      Have previously discussed risks of stimulant medication including, but not limited to, decreased appetite, risk of tics (and that they may be lasting), trouble sleeping, cardiac risks such as increased heart rate and blood pressure, and rare risk of sudden cardiac death.  Also risk of addiction/tolerance/dependence.    Administrative Billing:   Phone Call/Video Duration: 6 Minutes  Start: 3:06p  Stop: 3:12p    Patient Status:  The patient is being returned to the referring provider for ongoing care and medication prescribing.  The patient can be re-referred back to this service for further consultation if needed in the future.     Signed:   Cheyenne Jones DO  [facility] Psychiatry    Disclaimer: This note consists of symbols derived from keyboarding, dictation and/or voice recognition software. As a result, there may be errors in the script that have gone undetected. Please consider this when interpreting information found in this chart.

## 2023-09-08 NOTE — Clinical Note
Psychiatry Update/Consult. I am returning Dane Castillo's psychiatric care back to you for ongoing management and medication prescribing.  Patient has graduated from [facility] due to ongoing stability and readiness to return to primary care provider. Future medication refills will come from you (I gave 3 months today). I recommended that the patient follow up with you in about 3 months for a mental health visit. More details about treatment plan are in my note. If you have any questions or concerns, please don't hesitate to reach out.   Thanks for the referral! It was a pleasure working with Dane Castillo.   Cheyenne Jones, DO Collaborative Care Psychiatry

## 2024-02-01 ENCOUNTER — OFFICE VISIT (OUTPATIENT)
Dept: FAMILY MEDICINE | Facility: CLINIC | Age: 29
End: 2024-02-01
Payer: COMMERCIAL

## 2024-02-01 VITALS
TEMPERATURE: 97.6 F | OXYGEN SATURATION: 100 % | BODY MASS INDEX: 27 KG/M2 | RESPIRATION RATE: 17 BRPM | DIASTOLIC BLOOD PRESSURE: 87 MMHG | HEIGHT: 67 IN | WEIGHT: 172 LBS | SYSTOLIC BLOOD PRESSURE: 124 MMHG | HEART RATE: 74 BPM

## 2024-02-01 DIAGNOSIS — F90.9 ATTENTION DEFICIT HYPERACTIVITY DISORDER (ADHD), UNSPECIFIED ADHD TYPE: Primary | ICD-10-CM

## 2024-02-01 DIAGNOSIS — N50.811 PAIN IN RIGHT TESTICLE: ICD-10-CM

## 2024-02-01 PROCEDURE — 90686 IIV4 VACC NO PRSV 0.5 ML IM: CPT | Performed by: FAMILY MEDICINE

## 2024-02-01 PROCEDURE — 99213 OFFICE O/P EST LOW 20 MIN: CPT | Mod: 25 | Performed by: FAMILY MEDICINE

## 2024-02-01 PROCEDURE — 90471 IMMUNIZATION ADMIN: CPT | Performed by: FAMILY MEDICINE

## 2024-02-01 PROCEDURE — 90480 ADMN SARSCOV2 VAC 1/ONLY CMP: CPT | Performed by: FAMILY MEDICINE

## 2024-02-01 PROCEDURE — 91320 SARSCV2 VAC 30MCG TRS-SUC IM: CPT | Performed by: FAMILY MEDICINE

## 2024-02-01 ASSESSMENT — PAIN SCALES - GENERAL: PAINLEVEL: NO PAIN (0)

## 2024-02-01 NOTE — LETTER
[facility]  02[facility]  Patient: Dane Castillo  YOB: 1995  Medical Record Number: [id number]                                                                                  Non-Opioid Controlled Substance Agreement    This is an agreement between you and your provider regarding safe and appropriate use of controlled substances prescribed by your care team. Controlled substances are?medicines that can cause physical and mental dependence (abuse).     There are strict laws about having and using these medicines. We here at [facility] are  committed to working with you in your efforts to get better. To support you in this work, we'll help you schedule regular office appointments for medicine refills. If we must cancel or change your appointment for any reason, we'll make sure you have enough medicine to last until your next appointment.     As a Provider, I will:   Listen carefully to your concerns while treating you with respect.   Recommend a treatment plan that I believe is in your best interest and may involve therapies other than medicine.    Talk with you often about the possible benefits and the risk of harm of any medicine that we prescribe for you.  Assess the safety of this medicine and check how well it works.    Provide a plan on how to taper (discontinue or go off) using this medicine if the decision is made to stop its use.      ::  As a Patient, I understand controlled substances:     Are prescribed by my care provider to help me function or work and manage my condition(s).?  Are strong medicines and can cause serious side effects.     Need to be taken exactly as prescribed.?Combining controlled substances with certain medicines or chemicals (such as illegal drugs, alcohol, sedatives, sleeping pills, and benzodiazepines) can be dangerous or even fatal.? If I stop taking my medicines suddenly, I may have severe withdrawal symptoms.     The risks, benefits, and  side effects of these medicine(s) were explained to me. I agree that:    I will take part in other treatments as advised by my care team. This may be psychiatry or counseling, physical therapy, behavioral therapy, group treatment or a referral to specialist.    I will keep all my appointments and understand this is part of the monitoring of controlled substances.?My care team may require an office visit for EVERY controlled substance refill. If I miss appointments or don t follow instructions, my care team may stop my medicine    I will take my medicines as prescribed. I will not change the dose or schedule unless my care team tells me to. There will be no refills if I run out early.      I may be asked to come to the clinic and complete a urine drug test or complete a pill count. If I don t give a urine sample or participate in a pill count, the care team may stop my medicine.    I will only receive controlled substance prescriptions from this clinic. If I am treated by another provider, I will tell them that I am taking controlled substances and that I have a treatment agreement with this provider. I will inform my [facility] care team within one business day if I am given a prescription for any controlled substance by another healthcare provider. My [facility] care team can contact other providers and pharmacists about my use of any medicines.    It is up to me to make sure that I don't run out of my medicines on weekends or holidays.?If my care team is willing to refill my prescription without a visit, I must request refills only during office hours. Refills may take up to 3 business days to process. I will use one pharmacy to fill all my controlled substance prescriptions. I will notify the clinic about any changes to my insurance or medicine availability.    I am responsible for my prescriptions. If the medicine/prescription is lost, stolen or destroyed, it will not be replaced.?I also agree not  to share controlled substance medicines with anyone.     I am aware I should not use any illegal or recreational drugs. I agree not to drink alcohol unless my care team says I can.     If I enroll in the [place] Medical Cannabis program, I will tell my care team before my next refill.    I will tell my care team right away if I become pregnant, have a new medical problem treated outside of my regular clinic, or have a change in my medicines.     I understand that this medicine can affect my thinking, judgment and reaction time.? Alcohol and drugs affect the brain and body, which can affect the safety of my driving. Being under the influence of alcohol or drugs can affect my decision-making, behaviors, personal safety and the safety of others. Driving while impaired (DWI) can occur if a person is driving, operating or in physical control of a car, motorcycle, boat, snowmobile, ATV, motorbike, off-road vehicle or any other motor vehicle ([place] Statute 169A.20). I understand the risk if I choose to drive or operate any vehicle or machinery.    I understand that if I do not follow any of the conditions above, my prescriptions or treatment may be stopped or changed.   I agree that my provider, clinic care team and pharmacy may work with any city, state or federal law enforcement agency that investigates the misuse, sale or other diversion of my controlled medicine. I will allow my provider to discuss my care with, or share a copy of, this agreement with any other treating provider, pharmacy or emergency room where I receive care.     I have read this agreement and have asked questions about anything I did not understand.    ________________________________________________________  Patient Signature - Dane Castillo     ___________________                   Date     ________________________________________________________  Provider Signature - RIANA RINALDI MD       ___________________                   Date      ________________________________________________________  Witness Signature (required if provider not present while patient signing)          ___________________                   Date

## 2024-02-01 NOTE — PROGRESS NOTES
"   Assessment & Plan   Problem List Items Addressed This Visit    None  Visit Diagnoses       Attention deficit hyperactivity disorder (ADHD), unspecified ADHD type    -  Primary    Pain in right testicle        Relevant Orders    US Testicular & Scrotum w Doppler Ltd           Doing well on medication, will continue on current regimen.    Given right testicle symptoms, recommend ultrasound, patient agreeable.  If having worsening symptoms or any other new issues will contact us.         BMI  Estimated body mass index is 26.94 kg/m  as calculated from the following:    Height as of this encounter: 1.702 m (5' 7\").    Weight as of this encounter: 78 kg (172 lb).             Lakshmi Vela is a 28 year old, presenting for the following health issues:  Recheck Medication (Doing very well - no side effects)      2/1/2024     2:22 PM   Additional Questions   Roomed by      History of Present Illness       Reason for visit:  Adhd medication follow up    He eats 0-1 servings of fruits and vegetables daily.He consumes 1 sweetened beverage(s) daily.He exercises with enough effort to increase his heart rate 20 to 29 minutes per day.  He exercises with enough effort to increase his heart rate 3 or less days per week.   He is not taking prescribed medications regularly due to remembering to take.                 Review of Systems  Constitutional, HEENT, cardiovascular, pulmonary, gi and gu systems are negative, except as otherwise noted.      Objective    /87   Pulse 74   Temp 97.2  F (36.2  C)   Resp 17   Ht 1.702 m (5' 7\")   Wt 78 kg (172 lb)   SpO2 100%   BMI 26.94 kg/m    Body mass index is 26.94 kg/m .  Physical Exam   GENERAL: alert and no distress  NECK: no adenopathy, no asymmetry, masses, or scars  RESP: lungs clear to auscultation - no rales, rhonchi or wheezes  CV: regular rate and rhythm, normal S1 S2, no S3 or S4, no murmur, click or rub, no peripheral edema  ABDOMEN: soft, nontender, no " hepatosplenomegaly, no masses and bowel sounds normal   (male): testicles normal without atrophy or masses, epididymal enlargement right, no hernias, and penis normal without urethral discharge  MS: no gross musculoskeletal defects noted, no edema            Signed Electronically by: RIANA RINALDI MD

## 2024-02-08 ENCOUNTER — HOSPITAL ENCOUNTER (OUTPATIENT)
Dept: ULTRASOUND IMAGING | Facility: CLINIC | Age: 29
Discharge: HOME | End: 2024-02-08
Attending: FAMILY MEDICINE | Admitting: FAMILY MEDICINE
Payer: COMMERCIAL

## 2024-02-08 DIAGNOSIS — N50.811 PAIN IN RIGHT TESTICLE: ICD-10-CM

## 2024-02-08 PROCEDURE — 76870 US EXAM SCROTUM: CPT

## 2024-02-08 PROCEDURE — 93976 VASCULAR STUDY: CPT

## 2024-03-18 ENCOUNTER — MYC REFILL (OUTPATIENT)
Dept: FAMILY MEDICINE | Facility: CLINIC | Age: 29
End: 2024-03-18
Payer: COMMERCIAL

## 2024-03-18 DIAGNOSIS — F90.9 ATTENTION DEFICIT HYPERACTIVITY DISORDER (ADHD), UNSPECIFIED ADHD TYPE: ICD-10-CM

## 2024-03-19 RX ORDER — DEXTROAMPHETAMINE SACCHARATE, AMPHETAMINE ASPARTATE, DEXTROAMPHETAMINE SULFATE AND AMPHETAMINE SULFATE 2.5; 2.5; 2.5; 2.5 MG/1; MG/1; MG/1; MG/1
5-10 TABLET ORAL DAILY PRN
Qty: 30 TABLET | Refills: 0 | Status: SHIPPED | OUTPATIENT
Start: 2024-03-19 | End: 2024-05-20

## 2024-03-19 RX ORDER — DEXTROAMPHETAMINE SACCHARATE, AMPHETAMINE ASPARTATE MONOHYDRATE, DEXTROAMPHETAMINE SULFATE AND AMPHETAMINE SULFATE 3.75; 3.75; 3.75; 3.75 MG/1; MG/1; MG/1; MG/1
15 CAPSULE, EXTENDED RELEASE ORAL DAILY
Qty: 30 CAPSULE | Refills: 0 | Status: SHIPPED | OUTPATIENT
Start: 2024-03-19 | End: 2024-05-20

## 2024-05-02 ENCOUNTER — VIRTUAL VISIT (OUTPATIENT)
Dept: FAMILY MEDICINE | Facility: CLINIC | Age: 29
End: 2024-05-02
Attending: FAMILY MEDICINE
Payer: COMMERCIAL

## 2024-05-02 DIAGNOSIS — F90.9 ATTENTION DEFICIT HYPERACTIVITY DISORDER (ADHD), UNSPECIFIED ADHD TYPE: Primary | ICD-10-CM

## 2024-05-02 PROCEDURE — 99213 OFFICE O/P EST LOW 20 MIN: CPT | Mod: 95 | Performed by: FAMILY MEDICINE

## 2024-05-02 NOTE — PROGRESS NOTES
"Dane is a 29 year old who is being evaluated via a billable video visit.    How would you like to obtain your AVS? MyChart  If the video visit is dropped, the invitation should be resent by: Text to cell phone: [phone]  Will anyone else be joining your video visit? No      Assessment & Plan   Problem List Items Addressed This Visit    None  Visit Diagnoses       Attention deficit hyperactivity disorder (ADHD), unspecified ADHD type    -  Primary             Continue on current plan.  Follow-up 3 months.  Call sooner if any issues or concerns.       BMI  Estimated body mass index is 26.94 kg/m  as calculated from the following:    Height as of 2/1/24: 1.702 m (5' 7\").    Weight as of 2/1/24: 78 kg (172 lb).             Subjective   Dane is a 29 year old, presenting for the following health issues:  Medication Update (Med check. No concerns.)        5/2/2024     2:38 PM   Additional Questions   Roomed by Tessie GOMEZ CMA     History of Present Illness       Reason for visit:  Adhd medication follow up    He eats 0-1 servings of fruits and vegetables daily.He consumes 0 sweetened beverage(s) daily.He exercises with enough effort to increase his heart rate 10 to 19 minutes per day.  He exercises with enough effort to increase his heart rate 3 or less days per week. He is missing 3 dose(s) of medications per week.  He is not taking prescribed medications regularly due to remembering to take.               Review of Systems  Constitutional, HEENT, cardiovascular, pulmonary, gi and gu systems are negative, except as otherwise noted.      Objective    Vitals - Patient Reported  Height (Patient Reported): 167.6 cm (5' 6\")  Pain Score: No Pain (0)        Physical Exam   GENERAL: alert and no distress  EYES: Eyes grossly normal to inspection.  No discharge or erythema, or obvious scleral/conjunctival abnormalities.  RESP: No audible wheeze, cough, or visible cyanosis.    SKIN: Visible skin clear. No significant rash, " abnormal pigmentation or lesions.  NEURO: Cranial nerves grossly intact.  Mentation and speech appropriate for age.  PSYCH: Appropriate affect, tone, and pace of words          Video-Visit Details    Type of service:  Video Visit   Originating Location (pt. Location): Home    Distant Location (provider location):  On-site  Platform used for Video Visit: Luis Felipe  Signed Electronically by: RIANA RINALDI MD

## 2024-05-20 ENCOUNTER — MYC REFILL (OUTPATIENT)
Dept: FAMILY MEDICINE | Facility: CLINIC | Age: 29
End: 2024-05-20
Payer: COMMERCIAL

## 2024-05-20 DIAGNOSIS — F90.9 ATTENTION DEFICIT HYPERACTIVITY DISORDER (ADHD), UNSPECIFIED ADHD TYPE: ICD-10-CM

## 2024-05-22 RX ORDER — DEXTROAMPHETAMINE SACCHARATE, AMPHETAMINE ASPARTATE MONOHYDRATE, DEXTROAMPHETAMINE SULFATE AND AMPHETAMINE SULFATE 3.75; 3.75; 3.75; 3.75 MG/1; MG/1; MG/1; MG/1
15 CAPSULE, EXTENDED RELEASE ORAL DAILY
Qty: 30 CAPSULE | Refills: 0 | Status: SHIPPED | OUTPATIENT
Start: 2024-05-22 | End: 2024-07-26

## 2024-05-22 RX ORDER — DEXTROAMPHETAMINE SACCHARATE, AMPHETAMINE ASPARTATE, DEXTROAMPHETAMINE SULFATE AND AMPHETAMINE SULFATE 2.5; 2.5; 2.5; 2.5 MG/1; MG/1; MG/1; MG/1
5-10 TABLET ORAL DAILY PRN
Qty: 30 TABLET | Refills: 0 | Status: SHIPPED | OUTPATIENT
Start: 2024-05-22 | End: 2024-07-26

## 2024-06-16 ENCOUNTER — HEALTH MAINTENANCE LETTER (OUTPATIENT)
Age: 29
End: 2024-06-16

## 2024-08-08 ENCOUNTER — VIRTUAL VISIT (OUTPATIENT)
Dept: FAMILY MEDICINE | Facility: CLINIC | Age: 29
End: 2024-08-08
Attending: FAMILY MEDICINE
Payer: COMMERCIAL

## 2024-08-08 DIAGNOSIS — F90.9 ATTENTION DEFICIT HYPERACTIVITY DISORDER (ADHD), UNSPECIFIED ADHD TYPE: Primary | ICD-10-CM

## 2024-08-08 PROCEDURE — 99213 OFFICE O/P EST LOW 20 MIN: CPT | Mod: 95 | Performed by: FAMILY MEDICINE

## 2024-08-08 NOTE — PROGRESS NOTES
"Dane is a 29 year old who is being evaluated via a billable video visit.    How would you like to obtain your AVS? MyChart  If the video visit is dropped, the invitation should be resent by: Text to cell phone: [phone]  Will anyone else be joining your video visit? No      Assessment & Plan   Problem List Items Addressed This Visit    None  Visit Diagnoses       Attention deficit hyperactivity disorder (ADHD), unspecified ADHD type    -  Primary           Follow up 3 months.  Continue on current regimen       BMI  Estimated body mass index is 26.94 kg/m  as calculated from the following:    Height as of 2/1/24: 1.702 m (5' 7\").    Weight as of 2/1/24: 78 kg (172 lb).             Subjective   Dane is a 29 year old, presenting for the following health issues:  Recheck Medication      8/8/2024     2:54 PM   Additional Questions   Roomed by leandra sorensen cma     History of Present Illness       Reason for visit:  Adhd medication follow up    He eats 0-1 servings of fruits and vegetables daily.He consumes 0 sweetened beverage(s) daily.He exercises with enough effort to increase his heart rate 10 to 19 minutes per day.  He exercises with enough effort to increase his heart rate 3 or less days per week. He is missing 3 dose(s) of medications per week.  He is not taking prescribed medications regularly due to remembering to take.               Review of Systems  Constitutional, HEENT, cardiovascular, pulmonary, gi and gu systems are negative, except as otherwise noted.      Objective           Vitals:  No vitals were obtained today due to virtual visit.    Physical Exam   GENERAL: alert and no distress  EYES: Eyes grossly normal to inspection.  No discharge or erythema, or obvious scleral/conjunctival abnormalities.  RESP: No audible wheeze, cough, or visible cyanosis.    SKIN: Visible skin clear. No significant rash, abnormal pigmentation or lesions.  NEURO: Cranial nerves grossly intact.  Mentation and speech appropriate " for age.  PSYCH: Appropriate affect, tone, and pace of words          Video-Visit Details    Type of service:  Video Visit   Originating Location (pt. Location): Home    Distant Location (provider location):  On-site  Platform used for Video Visit: Luis Felipe  Signed Electronically by: RIANA RINALDI MD

## 2024-12-12 ENCOUNTER — VIRTUAL VISIT (OUTPATIENT)
Dept: FAMILY MEDICINE | Facility: CLINIC | Age: 29
End: 2024-12-12
Payer: COMMERCIAL

## 2024-12-12 DIAGNOSIS — F90.9 ATTENTION DEFICIT HYPERACTIVITY DISORDER (ADHD), UNSPECIFIED ADHD TYPE: Primary | ICD-10-CM

## 2024-12-12 PROCEDURE — 99213 OFFICE O/P EST LOW 20 MIN: CPT | Mod: 95 | Performed by: FAMILY MEDICINE

## 2024-12-12 RX ORDER — ATOMOXETINE 25 MG/1
25 CAPSULE ORAL DAILY
Qty: 30 CAPSULE | Refills: 1 | Status: SHIPPED | OUTPATIENT
Start: 2024-12-12

## 2024-12-12 ASSESSMENT — PATIENT HEALTH QUESTIONNAIRE - PHQ9
SUM OF ALL RESPONSES TO PHQ QUESTIONS 1-9: 17
10. IF YOU CHECKED OFF ANY PROBLEMS, HOW DIFFICULT HAVE THESE PROBLEMS MADE IT FOR YOU TO DO YOUR WORK, TAKE CARE OF THINGS AT HOME, OR GET ALONG WITH OTHER PEOPLE: VERY DIFFICULT
SUM OF ALL RESPONSES TO PHQ QUESTIONS 1-9: 17

## 2024-12-12 NOTE — PROGRESS NOTES
"Dane is a 29 year old who is being evaluated via a billable video visit.    How would you like to obtain your AVS? MyChart  If the video visit is dropped, the invitation should be resent by:   Will anyone else be joining your video visit? No      Assessment & Plan     Attention deficit hyperactivity disorder (ADHD), unspecified ADHD type  Will transition over to strattera, no doing well on adderal or vyvnanse  - atomoxetine (STRATTERA) 25 MG capsule; Take 1 capsule (25 mg) by mouth daily.          BMI  Estimated body mass index is 26.5 kg/m  as calculated from the following:    Height as of 11/14/24: 1.734 m (5' 8.25\").    Weight as of 11/14/24: 79.7 kg (175 lb 9.6 oz).             Subjective   Dane is a 29 year old, presenting for the following health issues:  Recheck Medication (Follow up on Vyvanse, thinks he may need a higher dose)        11/14/2024     2:34 PM   Additional Questions   Roomed by Tessie GOMEZ CMA     History of Present Illness       Reason for visit:  Medication follow up  Symptom onset:  3-7 days ago  Symptoms include:  Depression, Extremem mood change - Wants to change medications  Symptom intensity:  Severe  Symptom progression:  Worsening  Had these symptoms before:  No    He eats 0-1 servings of fruits and vegetables daily.He consumes 0 sweetened beverage(s) daily.He exercises with enough effort to increase his heart rate 10 to 19 minutes per day.  He exercises with enough effort to increase his heart rate 3 or less days per week. He is missing 3 dose(s) of medications per week.  He is not taking prescribed medications regularly due to side effects.               Review of Systems  Constitutional, HEENT, cardiovascular, pulmonary, gi and gu systems are negative, except as otherwise noted.      Objective           Vitals:  No vitals were obtained today due to virtual visit.    Physical Exam   GENERAL: alert and no distress  EYES: Eyes grossly normal to inspection.  No discharge or erythema, or " obvious scleral/conjunctival abnormalities.  RESP: No audible wheeze, cough, or visible cyanosis.    SKIN: Visible skin clear. No significant rash, abnormal pigmentation or lesions.  NEURO: Cranial nerves grossly intact.  Mentation and speech appropriate for age.  PSYCH: Appropriate affect, tone, and pace of words          Video-Visit Details    Type of service:  Video Visit   Originating Location (pt. Location): Home    Distant Location (provider location):  On-site  Platform used for Video Visit: Luis Felipe  Signed Electronically by: RIANA RINALDI MD

## 2024-12-12 NOTE — LETTER
December 12, 2024      Dane Castillo  [address] [place] [id number]        To Whom It May Concern:    Dane Castillo was seen in our clinic. He may return to work 12/16/24 without restrictions.      Sincerely,        RIANA RINALDI MD

## 2025-01-09 ENCOUNTER — VIRTUAL VISIT (OUTPATIENT)
Dept: FAMILY MEDICINE | Facility: CLINIC | Age: 30
End: 2025-01-09
Payer: COMMERCIAL

## 2025-01-09 DIAGNOSIS — F90.9 ATTENTION DEFICIT HYPERACTIVITY DISORDER (ADHD), UNSPECIFIED ADHD TYPE: ICD-10-CM

## 2025-01-09 RX ORDER — DEXTROAMPHETAMINE SACCHARATE, AMPHETAMINE ASPARTATE, DEXTROAMPHETAMINE SULFATE AND AMPHETAMINE SULFATE 2.5; 2.5; 2.5; 2.5 MG/1; MG/1; MG/1; MG/1
5-10 TABLET ORAL DAILY PRN
Qty: 30 TABLET | Refills: 0 | Status: SHIPPED | OUTPATIENT
Start: 2025-01-09

## 2025-01-09 RX ORDER — DEXTROAMPHETAMINE SACCHARATE, AMPHETAMINE ASPARTATE MONOHYDRATE, DEXTROAMPHETAMINE SULFATE AND AMPHETAMINE SULFATE 5; 5; 5; 5 MG/1; MG/1; MG/1; MG/1
20 CAPSULE, EXTENDED RELEASE ORAL DAILY
Qty: 30 CAPSULE | Refills: 0 | Status: SHIPPED | OUTPATIENT
Start: 2025-01-09 | End: 2025-02-08

## 2025-01-09 NOTE — PATIENT INSTRUCTIONS
1)  please start Adderall XR 20mg once daily  2)   please use Adderall 10mg mid day as needed  3)  follow up in one month, call sooner if issues.

## 2025-01-09 NOTE — PROGRESS NOTES
"Dane is a 29 year old who is being evaluated via a billable video visit.    How would you like to obtain your AVS? MyChart  If the video visit is dropped, the invitation should be resent by: Text to cell phone: [phone]  Will anyone else be joining your video visit? No      Assessment & Plan     Attention deficit hyperactivity disorder (ADHD), unspecified ADHD type     - amphetamine-dextroamphetamine (ADDERALL XR) 20 MG 24 hr capsule; Take 1 capsule (20 mg) by mouth daily.  - amphetamine-dextroamphetamine (ADDERALL) 10 MG tablet; Take 0.5-1 tablets (5-10 mg) by mouth daily as needed (ADHD).          BMI  Estimated body mass index is 26.5 kg/m  as calculated from the following:    Height as of 11/14/24: 1.734 m (5' 8.25\").    Weight as of 11/14/24: 79.7 kg (175 lb 9.6 oz).         Patient Instructions   1)  please start Adderall XR 20mg once daily  2)   please use Adderall 10mg mid day as needed  3)  follow up in one month, call sooner if issues.       Subjective   Dane is a 29 year old, presenting for the following health issues:  A.D.H.D        1/9/2025    10:12 AM   Additional Questions   Roomed by jakob cavanaugh     History of Present Illness       Reason for visit:  Medication follow up    He eats 0-1 servings of fruits and vegetables daily.He consumes 0 sweetened beverage(s) daily.He exercises with enough effort to increase his heart rate 10 to 19 minutes per day.  He exercises with enough effort to increase his heart rate 3 or less days per week.   He is taking medications regularly.     ADHD Follow-Up    Date of last ADHD office visit: 11/14/2025  Status since last visit: None  Taking controlled (daily) medications as prescribed: Yes                       Parent/Patient Concerns with Medications:  feels like they aren't really doing anything   ADHD Medication       Attention-Deficit/Hyperactivity Disorder (ADHD) Agents Disp Start End     atomoxetine (STRATTERA) 25 MG capsule 30 capsule 12/12/2024 --    Sig - " Route: Take 1 capsule (25 mg) by mouth daily. - Oral    Class: E-Prescribe          Sleep: no problems  Home/Family Concerns: None  Peer Concerns: None    Co-Morbid Diagnosis: None    Currently in counseling: No    Medication side effects:  Side effects noted: none  Denies : none                 Review of Systems  Constitutional, HEENT, cardiovascular, pulmonary, gi and gu systems are negative, except as otherwise noted.      Objective           Vitals:  No vitals were obtained today due to virtual visit.    Physical Exam   GENERAL: alert and no distress  EYES: Eyes grossly normal to inspection.  No discharge or erythema, or obvious scleral/conjunctival abnormalities.  RESP: No audible wheeze, cough, or visible cyanosis.    SKIN: Visible skin clear. No significant rash, abnormal pigmentation or lesions.  NEURO: Cranial nerves grossly intact.  Mentation and speech appropriate for age.  PSYCH: Appropriate affect, tone, and pace of words          Video-Visit Details    Type of service:  Video Visit   Originating Location (pt. Location): Home    Distant Location (provider location):  On-site  Platform used for Video Visit: Luis Felipe  Signed Electronically by: RIANA RINALDI MD

## 2025-05-12 ENCOUNTER — MYC REFILL (OUTPATIENT)
Dept: FAMILY MEDICINE | Facility: CLINIC | Age: 30
End: 2025-05-12
Payer: COMMERCIAL

## 2025-05-12 DIAGNOSIS — F90.9 ATTENTION DEFICIT HYPERACTIVITY DISORDER (ADHD), UNSPECIFIED ADHD TYPE: ICD-10-CM

## 2025-05-13 RX ORDER — DEXTROAMPHETAMINE SACCHARATE, AMPHETAMINE ASPARTATE MONOHYDRATE, DEXTROAMPHETAMINE SULFATE AND AMPHETAMINE SULFATE 5; 5; 5; 5 MG/1; MG/1; MG/1; MG/1
20 CAPSULE, EXTENDED RELEASE ORAL DAILY
Qty: 30 CAPSULE | Refills: 0 | Status: SHIPPED | OUTPATIENT
Start: 2025-05-13

## 2025-05-13 NOTE — TELEPHONE ENCOUNTER
Notified patient that prescription has been approved and to schedule a visit in 2 months for next refill.  Sejal LASSITER MA

## 2025-07-29 ENCOUNTER — MYC REFILL (OUTPATIENT)
Dept: FAMILY MEDICINE | Facility: CLINIC | Age: 30
End: 2025-07-29
Payer: COMMERCIAL

## 2025-07-29 DIAGNOSIS — F90.9 ATTENTION DEFICIT HYPERACTIVITY DISORDER (ADHD), UNSPECIFIED ADHD TYPE: ICD-10-CM

## 2025-07-31 RX ORDER — DEXTROAMPHETAMINE SACCHARATE, AMPHETAMINE ASPARTATE MONOHYDRATE, DEXTROAMPHETAMINE SULFATE AND AMPHETAMINE SULFATE 5; 5; 5; 5 MG/1; MG/1; MG/1; MG/1
20 CAPSULE, EXTENDED RELEASE ORAL DAILY
Qty: 30 CAPSULE | Refills: 0 | Status: SHIPPED | OUTPATIENT
Start: 2025-07-31

## 2025-07-31 NOTE — TELEPHONE ENCOUNTER
Pending Prescriptions:                       Disp   Refills    amphetamine-dextroamphetamine (ADDERALL XR*30 cap*0        Sig: Take 1 capsule (20 mg) by mouth daily.    Routing refill request to provider for review/approval because:  Has a provider visit scheduled on 8/6/25; routing refill request for review.    Requested Prescriptions   Pending Prescriptions Disp Refills    amphetamine-dextroamphetamine (ADDERALL XR) 20 MG 24 hr capsule 30 capsule 0     Sig: Take 1 capsule (20 mg) by mouth daily.       Rx Protocol Controlled Substance Failed - 7/31/2025 11:32 AM        Failed - Visit with relevant provider in past 3 months or upcoming 3 months (provided they have been seen in the last 6 months)        Failed - Urine drug screeen results on file in past 12 months     [unfilled]           Failed - Controlled Substance Agreement on file in last 12 months     Please review last Controlled Substance Pain agreement document.   CSA -- Encounter Level:    CSA: None found at the encounter level.       CSA -- Patient Level:    CSA: None found at the patient level.               Failed - Auto Fail - Please forward to Provider        Passed - Medication is active on med list and the sig matches        Passed - Medication not refilled in past 28 days     Invalid Medication Grouper

## 2025-08-06 ENCOUNTER — VIRTUAL VISIT (OUTPATIENT)
Dept: FAMILY MEDICINE | Facility: CLINIC | Age: 30
End: 2025-08-06
Payer: COMMERCIAL

## 2025-08-06 DIAGNOSIS — F90.9 ATTENTION DEFICIT HYPERACTIVITY DISORDER (ADHD), UNSPECIFIED ADHD TYPE: Primary | ICD-10-CM

## 2025-08-06 PROCEDURE — 98005 SYNCH AUDIO-VIDEO EST LOW 20: CPT | Performed by: FAMILY MEDICINE

## 2025-08-06 ASSESSMENT — ANXIETY QUESTIONNAIRES
6. BECOMING EASILY ANNOYED OR IRRITABLE: NOT AT ALL
5. BEING SO RESTLESS THAT IT IS HARD TO SIT STILL: NEARLY EVERY DAY
GAD7 TOTAL SCORE: 7
7. FEELING AFRAID AS IF SOMETHING AWFUL MIGHT HAPPEN: NEARLY EVERY DAY
8. IF YOU CHECKED OFF ANY PROBLEMS, HOW DIFFICULT HAVE THESE MADE IT FOR YOU TO DO YOUR WORK, TAKE CARE OF THINGS AT HOME, OR GET ALONG WITH OTHER PEOPLE?: NOT DIFFICULT AT ALL
GAD7 TOTAL SCORE: 7
4. TROUBLE RELAXING: NOT AT ALL
IF YOU CHECKED OFF ANY PROBLEMS ON THIS QUESTIONNAIRE, HOW DIFFICULT HAVE THESE PROBLEMS MADE IT FOR YOU TO DO YOUR WORK, TAKE CARE OF THINGS AT HOME, OR GET ALONG WITH OTHER PEOPLE: NOT DIFFICULT AT ALL
GAD7 TOTAL SCORE: 7
1. FEELING NERVOUS, ANXIOUS, OR ON EDGE: SEVERAL DAYS
7. FEELING AFRAID AS IF SOMETHING AWFUL MIGHT HAPPEN: NEARLY EVERY DAY
2. NOT BEING ABLE TO STOP OR CONTROL WORRYING: NOT AT ALL
3. WORRYING TOO MUCH ABOUT DIFFERENT THINGS: NOT AT ALL

## 2025-08-06 ASSESSMENT — PATIENT HEALTH QUESTIONNAIRE - PHQ9
SUM OF ALL RESPONSES TO PHQ QUESTIONS 1-9: 2
10. IF YOU CHECKED OFF ANY PROBLEMS, HOW DIFFICULT HAVE THESE PROBLEMS MADE IT FOR YOU TO DO YOUR WORK, TAKE CARE OF THINGS AT HOME, OR GET ALONG WITH OTHER PEOPLE: SOMEWHAT DIFFICULT
SUM OF ALL RESPONSES TO PHQ QUESTIONS 1-9: 2